# Patient Record
Sex: FEMALE | Race: WHITE | NOT HISPANIC OR LATINO | Employment: PART TIME | URBAN - METROPOLITAN AREA
[De-identification: names, ages, dates, MRNs, and addresses within clinical notes are randomized per-mention and may not be internally consistent; named-entity substitution may affect disease eponyms.]

---

## 2018-03-23 PROBLEM — F41.8 DEPRESSION WITH ANXIETY: Status: ACTIVE | Noted: 2018-03-23

## 2018-03-23 PROBLEM — F60.3 BORDERLINE PERSONALITY DISORDER (HCC): Status: ACTIVE | Noted: 2018-03-23

## 2018-04-13 PROBLEM — F33.9 MAJOR DEPRESSIVE DISORDER, RECURRENT EPISODE WITH ANXIOUS DISTRESS (HCC): Status: ACTIVE | Noted: 2018-04-13

## 2018-05-23 ENCOUNTER — DOCUMENTATION (OUTPATIENT)
Dept: BEHAVIORAL HEALTH | Facility: PHYSICIAN GROUP | Age: 23
End: 2018-05-23

## 2018-05-24 ENCOUNTER — HOSPITAL ENCOUNTER (OUTPATIENT)
Dept: BEHAVIORAL HEALTH | Facility: MEDICAL CENTER | Age: 23
End: 2018-05-24
Attending: PSYCHIATRY & NEUROLOGY
Payer: OTHER GOVERNMENT

## 2018-05-24 DIAGNOSIS — F60.3 BORDERLINE PERSONALITY DISORDER (HCC): ICD-10-CM

## 2018-05-24 DIAGNOSIS — F33.9 MAJOR DEPRESSIVE DISORDER, RECURRENT EPISODE WITH ANXIOUS DISTRESS (HCC): ICD-10-CM

## 2018-05-24 RX ORDER — VENLAFAXINE 75 MG/1
75 TABLET ORAL 3 TIMES DAILY
COMMUNITY
End: 2018-06-08
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
## 2018-05-29 ENCOUNTER — OFFICE VISIT (OUTPATIENT)
Dept: BEHAVIORAL HEALTH | Facility: PHYSICIAN GROUP | Age: 23
End: 2018-05-29
Payer: OTHER GOVERNMENT

## 2018-05-29 DIAGNOSIS — Z62.810 HISTORY OF SEXUAL ABUSE IN CHILDHOOD: ICD-10-CM

## 2018-05-29 DIAGNOSIS — F60.3 BORDERLINE PERSONALITY DISORDER (HCC): ICD-10-CM

## 2018-05-29 PROCEDURE — 99204 OFFICE O/P NEW MOD 45 MIN: CPT | Performed by: STUDENT IN AN ORGANIZED HEALTH CARE EDUCATION/TRAINING PROGRAM
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
## 2018-05-30 ENCOUNTER — HOSPITAL ENCOUNTER (OUTPATIENT)
Dept: BEHAVIORAL HEALTH | Facility: MEDICAL CENTER | Age: 23
End: 2018-05-30
Attending: PSYCHIATRY & NEUROLOGY
Payer: OTHER GOVERNMENT

## 2018-05-30 DIAGNOSIS — F33.9 MAJOR DEPRESSIVE DISORDER, RECURRENT EPISODE WITH ANXIOUS DISTRESS (HCC): ICD-10-CM

## 2018-05-30 PROCEDURE — G0177 OPPS/PHP; TRAIN & EDUC SERV: HCPCS

## 2018-05-30 PROCEDURE — G0176 OPPS/PHP;ACTIVITY THERAPY: HCPCS

## 2018-05-30 PROCEDURE — G0411 INTER ACTIVE GRP PSYCH PARTI: HCPCS

## 2018-05-30 NOTE — BH THERAPY
Group Therapy Checklist  Attendance: Attended  Attendance Duration (min): 60  Number of Participants: 9  Program/Group: Partial Hospital Program  Topics Covered: Cognitive distortions  Participation: Active verbal participation  Affect/Mood Range: Normal range, Flexible  Affect/Mood Display: Congruent w/content  Cognition: Alert, Oriented  Evidence of Imminent Suicide Risk: No  Evidence of imminent homicide risk: No  Therapeutic Interventions: Cognitive clarification  Progress Toward Treatment Goal: Mild improvement

## 2018-05-30 NOTE — ADDENDUM NOTE
Encounter addended by: REINIER Cohen on: 5/30/2018 10:55 AM<BR>    Actions taken: Sign clinical note

## 2018-05-30 NOTE — BH THERAPY
Group Therapy Checklist  Attendance: Attended  Attendance Duration (min): 60  Program/Group: Partial Hospital Program  Topics Covered: Pain vs. Suffering  Participation: Active verbal participation  Affect/Mood Range: Normal range, Flexible  Affect/Mood Display: Congruent w/content  Cognition: Alert, Oriented  Evidence of Imminent Suicide Risk: No  Evidence of imminent homicide risk: No  Therapeutic Interventions: Cognitive clarification, Emotion clarification  Progress Toward Treatment Goal: Moderate improvement

## 2018-05-30 NOTE — BH THERAPY
Group Therapy Checklist  Attendance: Attended  Attendance Duration (min): 60  Number of Participants: 5  Program/Group: Partial Hospital Program  Topics Covered:  (creative art)  Participation: Active verbal participation  Affect/Mood Range: Normal range, Flexible  Affect/Mood Display: Congruent w/content  Cognition: Alert, Oriented  Evidence of Imminent Suicide Risk: No  Evidence of imminent homicide risk: No  Therapeutic Interventions: Socialization, Leisure and Recreation skills  Progress Toward Treatment Goal: Moderate improvement

## 2018-05-31 ENCOUNTER — HOSPITAL ENCOUNTER (OUTPATIENT)
Dept: BEHAVIORAL HEALTH | Facility: MEDICAL CENTER | Age: 23
End: 2018-05-31
Attending: PSYCHIATRY & NEUROLOGY
Payer: OTHER GOVERNMENT

## 2018-05-31 DIAGNOSIS — F33.9 MAJOR DEPRESSIVE DISORDER, RECURRENT EPISODE WITH ANXIOUS DISTRESS (HCC): ICD-10-CM

## 2018-05-31 PROCEDURE — G0176 OPPS/PHP;ACTIVITY THERAPY: HCPCS

## 2018-05-31 PROCEDURE — G0410 GRP PSYCH PARTIAL HOSP 45-50: HCPCS

## 2018-05-31 PROCEDURE — G0177 OPPS/PHP; TRAIN & EDUC SERV: HCPCS

## 2018-05-31 NOTE — BH THERAPY
Group Therapy Checklist  Attendance: Attended  Attendance Duration (min):  (90 min.)  Number of Participants: 11  Program/Group: Partial Hospital Program  Topics Covered: Other (Comment): (Group Therapy)  Participation: No verbal participation, Attentive  Pt was triggered when a peer was talking about needing to give forgiveness to one who has wronged you no matter what happened.   Affect/Mood Range: Constricted  Affect/Mood Display: Congruent w/content, Angry  Cognition: Oriented, Alert  Evidence of Imminent Suicide Risk: No  Evidence of imminent homicide risk: No  Therapeutic Interventions: Emotion clarification, Cognitive clarification  Progress Toward Treatment Goal: Mild improvement

## 2018-05-31 NOTE — BH THERAPY
Group Therapy Checklist  Attendance: Attended  Attendance Duration (min): 60  Number of Participants: 5  Program/Group: [facility] Hospital Program  Topics Covered: Regulating emotions  Participation: Active verbal participation  Affect/Mood Range: Normal range, Flexible  Affect/Mood Display: Congruent w/content  Cognition: Alert, Oriented  Evidence of Imminent Suicide Risk: No  Evidence of imminent homicide risk: No  Therapeutic Interventions: Emotion clarification  Progress Toward Treatment Goal: Moderate improvement

## 2018-05-31 NOTE — BH THERAPY
Group Therapy Checklist  Attendance: Attended  Attendance Duration (min): 60  Number of Participants: 4  Program/Group: Partial Hospital Program  Topics Covered:  (Interactive game)  Participation: Active verbal participation  Affect/Mood Range: Normal range, Flexible  Affect/Mood Display: Congruent w/content  Cognition: Alert, Oriented  Evidence of Imminent Suicide Risk: No  Evidence of imminent homicide risk: No  Therapeutic Interventions: Socialization, Leisure and Recreation skills  Progress Toward Treatment Goal: Moderate improvement

## 2018-05-31 NOTE — BH THERAPY
Group Therapy Checklist  Attendance: Attended  Attendance Duration (min): 60  Number of Participants: 10  Program/Group: Partial Hospital Program  Topics Covered: Spirituality  Participation: Active verbal participation  Affect/Mood Range: Normal range, Flexible  Affect/Mood Display: Congruent w/content  Cognition: Alert, Oriented  Evidence of Imminent Suicide Risk: No  Evidence of imminent homicide risk: No  Therapeutic Interventions: Emotion clarification, Cognitive clarification  Progress Toward Treatment Goal: Moderate improvement

## 2018-06-01 ENCOUNTER — HOSPITAL ENCOUNTER (OUTPATIENT)
Dept: BEHAVIORAL HEALTH | Facility: MEDICAL CENTER | Age: 23
End: 2018-06-01
Attending: PSYCHIATRY & NEUROLOGY
Payer: OTHER GOVERNMENT

## 2018-06-01 DIAGNOSIS — F33.9 MAJOR DEPRESSIVE DISORDER, RECURRENT EPISODE WITH ANXIOUS DISTRESS (HCC): ICD-10-CM

## 2018-06-01 PROCEDURE — G0410 GRP PSYCH PARTIAL HOSP 45-50: HCPCS

## 2018-06-01 PROCEDURE — G0176 OPPS/PHP;ACTIVITY THERAPY: HCPCS

## 2018-06-01 PROCEDURE — G0177 OPPS/PHP; TRAIN & EDUC SERV: HCPCS

## 2018-06-01 NOTE — BH THERAPY
Group Therapy Checklist  Attendance: Attended  Attendance Duration (min): 60  Number of Participants: 4  Program/Group: Partial Hospital Program  Topics Covered:  (creative art)  Participation: Active verbal participation  Affect/Mood Range: Normal range, Flexible  Affect/Mood Display: Congruent w/content  Cognition: Alert, Oriented  Evidence of Imminent Suicide Risk: No  Evidence of imminent homicide risk: No  Therapeutic Interventions: Socialization, Leisure and Recreation skills  Progress Toward Treatment Goal: Moderate improvement
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
## 2018-06-01 NOTE — BH THERAPY
Group Therapy Checklist  Attendance: Attended  Attendance Duration (min): 60  Number of Participants: 4  Program/Group: Partial Hospital Program  Topics Covered:  (Choices and Balance)  Participation: Active verbal participation  Affect/Mood Range: Normal range, Flexible  Affect/Mood Display: Congruent w/content  Cognition: Alert, Oriented  Evidence of Imminent Suicide Risk: No  Evidence of imminent homicide risk: No  Therapeutic Interventions: Values clarification  Progress Toward Treatment Goal: Moderate improvement

## 2018-06-01 NOTE — BH THERAPY
Group Therapy Checklist  Attendance: Attended  Attendance Duration (min):  (90 min.)  Number of Participants: 9  Program/Group: Partial Hospital Program  Topics Covered: Weekend planning  Participation: Active verbal participation (Pt expressed isdeas and support for a peer in group. )  Affect/Mood Range: Normal range  Affect/Mood Display: Congruent w/content  Cognition: Oriented, Alert  Evidence of Imminent Suicide Risk: No  Evidence of imminent homicide risk: No  Therapeutic Interventions: Emotion clarification, Cognitive clarification  Progress Toward Treatment Goal: Moderate improvement

## 2018-06-01 NOTE — BH THERAPY
Group Therapy Checklist  Attendance: Attended  Attendance Duration (min): 60  Number of Participants: 10  Program/Group: Partial Hospital Program  Topics Covered: ACT concept intro  Participation: Active verbal participation  Affect/Mood Range: Normal range, Flexible  Affect/Mood Display: Congruent w/content  Cognition: Alert, Oriented  Evidence of Imminent Suicide Risk: No  Evidence of imminent homicide risk: No  Therapeutic Interventions: Cognitive clarification, Values clarification  Progress Toward Treatment Goal: Moderate improvement

## 2018-06-04 ENCOUNTER — HOSPITAL ENCOUNTER (OUTPATIENT)
Dept: BEHAVIORAL HEALTH | Facility: MEDICAL CENTER | Age: 23
End: 2018-06-04
Attending: PSYCHIATRY & NEUROLOGY
Payer: OTHER GOVERNMENT

## 2018-06-04 DIAGNOSIS — F33.9 MAJOR DEPRESSIVE DISORDER, RECURRENT EPISODE WITH ANXIOUS DISTRESS (HCC): ICD-10-CM

## 2018-06-04 PROCEDURE — G0410 GRP PSYCH PARTIAL HOSP 45-50: HCPCS

## 2018-06-04 PROCEDURE — G0177 OPPS/PHP; TRAIN & EDUC SERV: HCPCS

## 2018-06-04 PROCEDURE — G0176 OPPS/PHP;ACTIVITY THERAPY: HCPCS

## 2018-06-04 NOTE — BH THERAPY
Group Therapy Checklist  Attendance: Attended  Attendance Duration (min): 60  Number of Participants: 4  Program/Group: Partial Hospital Program  Topics Covered:  (creative art)  Participation: Active verbal participation, Attentive  Affect/Mood Range: Normal range, Flexible  Affect/Mood Display: Congruent w/content  Cognition: Alert, Oriented  Evidence of Imminent Suicide Risk: No  Evidence of imminent homicide risk: No  Therapeutic Interventions: Socialization, Leisure and Recreation skills  Progress Toward Treatment Goal: Moderate improvement

## 2018-06-04 NOTE — BH THERAPY
Group Therapy Checklist  Attendance: Attended  Attendance Duration (min): 60  Number of Participants: 9  Program/Group: Partial Hospital Program  Topics Covered: Assertiveness  Participation: Active verbal participation  Affect/Mood Range: Normal range, Flexible  Affect/Mood Display: Congruent w/content  Cognition: Alert, Oriented  Evidence of Imminent Suicide Risk: No  Evidence of imminent homicide risk: No  Therapeutic Interventions: Cognitive clarification, Communication skills  Progress Toward Treatment Goal: Moderate improvement

## 2018-06-04 NOTE — BH THERAPY
Group Therapy Checklist  Attendance: Attended  Attendance Duration (min): 60  Number of Participants: 4  Program/Group: Partial Hospital Program  Topics Covered: [place] kindness  Participation: Active verbal participation  Affect/Mood Range: Normal range, Flexible  Affect/Mood Display: Congruent w/content  Cognition: Alert, Oriented  Evidence of Imminent Suicide Risk: No  Evidence of imminent homicide risk: No  Therapeutic Interventions: Cognitive clarification, Emotion clarification  Progress Toward Treatment Goal: Moderate improvement

## 2018-06-05 ENCOUNTER — HOSPITAL ENCOUNTER (OUTPATIENT)
Dept: BEHAVIORAL HEALTH | Facility: MEDICAL CENTER | Age: 23
End: 2018-06-05
Attending: PSYCHIATRY & NEUROLOGY
Payer: OTHER GOVERNMENT

## 2018-06-05 ENCOUNTER — TELEPHONE (OUTPATIENT)
Dept: BEHAVIORAL HEALTH | Facility: MEDICAL CENTER | Age: 23
End: 2018-06-05

## 2018-06-05 DIAGNOSIS — F33.9 MAJOR DEPRESSIVE DISORDER, RECURRENT EPISODE WITH ANXIOUS DISTRESS (HCC): ICD-10-CM

## 2018-06-05 PROCEDURE — G0177 OPPS/PHP; TRAIN & EDUC SERV: HCPCS | Performed by: MARRIAGE & FAMILY THERAPIST

## 2018-06-05 PROCEDURE — G0410 GRP PSYCH PARTIAL HOSP 45-50: HCPCS

## 2018-06-05 PROCEDURE — G0177 OPPS/PHP; TRAIN & EDUC SERV: HCPCS

## 2018-06-05 PROCEDURE — G0176 OPPS/PHP;ACTIVITY THERAPY: HCPCS

## 2018-06-05 NOTE — BH THERAPY
Group Therapy Checklist  Attendance: Attended  Attendance Duration (min):  (90 min)  Number of Participants: 8  Program/Group: Partial Hospital Program  Topics Covered: Other (Comment): (Group Therapy)  Participation: Limited verbal participation, Attentive, Supportive to other group members (Pt expressed similar feelings identiying with a peer in group to support her. )  Affect/Mood Range: Normal range  Affect/Mood Display: Congruent w/content  Cognition: Oriented, Alert  Evidence of Imminent Suicide Risk: No  Evidence of imminent homicide risk: No  Therapeutic Interventions: Emotion clarification, Cognitive clarification  Progress Toward Treatment Goal: Moderate improvement

## 2018-06-05 NOTE — BH THERAPY
Group Therapy Checklist  Attendance: Attended  Attendance Duration (min): 60  Number of Participants: 4  Program/Group: Partial Hospital Program  Topics Covered: Time Management  Participation: Active verbal participation  Affect/Mood Range: Normal range, Flexible  Affect/Mood Display: Congruent w/content  Cognition: Alert, Oriented  Evidence of Imminent Suicide Risk: No  Evidence of imminent homicide risk: No  Therapeutic Interventions: Cognitive clarification, Goal-setting  Progress: Moderate improvement

## 2018-06-05 NOTE — BH THERAPY
Group Therapy Checklist  Attendance: Attended  Attendance Duration (min): 90  Number of Participants: 11  Program/Group: Partial Hospital Program  Topics Covered:  (process group)  Participation: Active verbal participation, Supportive to other group members, Attentive, Open to feedback  Affect/Mood Range: Normal range, Flexible  Affect/Mood Display: Congruent w/content  Cognition: Alert, Oriented  Evidence of Imminent Suicide Risk: No  Evidence of imminent homicide risk: No  Therapeutic Interventions: Emotion clarification, Supportive psychotherapy  Progress Toward Treatment Goal: Moderate improvement  Patient actively participated in process group.  Addressed active unresolved emotional issues. Taught some emotional skills and techniques to assist with the emotional skill building that relates to their presenting issues and active treatment plan.

## 2018-06-05 NOTE — BH THERAPY
Group Therapy Checklist  Attendance: Attended  Attendance Duration (min):  (60 min.)  Number of Participants: 3  Program/Group: Partial Hospital Program  Topics Covered: Other (Comment): (Activity Group)  Participation: Active verbal participation, Attentive, Supportive to other group members  Affect/Mood Range: Normal range  Affect/Mood Display: Congruent w/content  Cognition: Oriented, Alert  Evidence of Imminent Suicide Risk: No  Evidence of imminent homicide risk: No  Therapeutic Interventions: Leisure and Recreation skills  Progress Toward Treatment Goal: Moderate improvement

## 2018-06-06 ENCOUNTER — HOSPITAL ENCOUNTER (OUTPATIENT)
Dept: BEHAVIORAL HEALTH | Facility: MEDICAL CENTER | Age: 23
End: 2018-06-06
Attending: PSYCHIATRY & NEUROLOGY
Payer: OTHER GOVERNMENT

## 2018-06-06 DIAGNOSIS — F33.9 MAJOR DEPRESSIVE DISORDER, RECURRENT EPISODE WITH ANXIOUS DISTRESS (HCC): ICD-10-CM

## 2018-06-06 PROCEDURE — G0177 OPPS/PHP; TRAIN & EDUC SERV: HCPCS

## 2018-06-06 PROCEDURE — G0411 INTER ACTIVE GRP PSYCH PARTI: HCPCS

## 2018-06-06 PROCEDURE — G0176 OPPS/PHP;ACTIVITY THERAPY: HCPCS

## 2018-06-06 NOTE — CARE PLAN
Problem: Ineffective Coping  Goal: Achieve stable functioning  Apoorva will attend [facility] 9am-3pm M-F and process recent suicide attempt, develop coping skills and increase her understanding of her mental health.  She will verbalize strategies to enhance coping skills during process groups and in individual counseling sessions.      Intervention: Conflict Resolution  Processed relationship with her brother and his girlfriend.

## 2018-06-06 NOTE — BH THERAPY
Group Therapy Checklist  Attendance: Attended  Attendance Duration (min): 60  Number of Participants: 3  Program/Group: Partial Hospital Program  Topics Covered: Caring for Ourselves  Participation: Active verbal participation  Affect/Mood Range: Normal range, Flexible  Affect/Mood Display: Congruent w/content  Cognition: Alert, Oriented  Evidence of Imminent Suicide Risk: No  Evidence of imminent homicide risk: No  Therapeutic Interventions: Cognitive clarification, Self-care skills  Progress Toward Treatment Goal: Moderate improvement

## 2018-06-06 NOTE — BH THERAPY
Group Therapy Checklist  Attendance: Attended  Attendance Duration (min): 60  Number of Participants: 3  Program/Group: Partial Hospital Program  Topics Covered:  (creative art)  Participation: Active verbal participation  Affect/Mood Range: Normal range, Flexible  Affect/Mood Display: Congruent w/content  Cognition: Alert, Oriented  Evidence of Imminent Suicide Risk: No  Evidence of imminent homicide risk: No  Therapeutic Interventions: Socialization, Leisure and Recreation skills  Progress Toward Treatment Goal: Moderate improvement

## 2018-06-06 NOTE — BH THERAPY
[facility]  Therapy Progress Note    Patient Name: Apoorva Parry  Patient MRN: [id number]  Today's Date: 6/6/2018     Type of session:Individual psychotherapy  Length of session: 30 minutes  Persons in attendance:Patient    Subjective/New Info: Processed her relationship with her brother, feeling sad that he cannot accept her mental health recovery. Apoorva feels supported by him and hopes to return to an even relationship with him. She has support from her mother who resides in [place]. Denies SI or self harm x 3 weeks.     Objective/Observations:   Participation: Active verbal participation, Attentive, Engaged and Open to feedback   Grooming: Casual and Neat   Cognition: Alert and Fully Oriented   Eye contact: Good   Mood: Anxious   Affect: Flexible, Full range, Congruent with content, Sad, Anxious and Tearful   Thought process: Logical and Goal-directed   Speech: Rate within normal limits and Volume within normal limits   Other:     Diagnoses: No diagnosis found. BPD    Current risk:   SUICIDE: Low   Homicide: Not applicable   Self-harm: Not applicable   Relapse: Not applicable   Other:    Safety Plan reviewed? Not Indicated   If evidence of imminent risk is present, intervention/plan:     Therapeutic Intervention(s): Distress tolerance skills, Interpersonal effectiveness skills, Parenting/familial roles addressed, Review treatment plan, Self-care skills and Supportive psychotherapy    Treatment Goal(s)/Objective(s) addressed: distress tolerance skills     Progress toward Treatment Goals: Moderate improvement    Plan:  - Continue Partial Hospital Program  - Next appointment scheduled:  6/7/2018  - Patient is in agreement with the above plan:  YES    Hamida Parker R.N.  6/6/2018

## 2018-06-07 ENCOUNTER — TELEPHONE (OUTPATIENT)
Dept: BEHAVIORAL HEALTH | Facility: MEDICAL CENTER | Age: 23
End: 2018-06-07

## 2018-06-07 ENCOUNTER — HOSPITAL ENCOUNTER (OUTPATIENT)
Dept: BEHAVIORAL HEALTH | Facility: MEDICAL CENTER | Age: 23
End: 2018-06-07
Attending: PSYCHIATRY & NEUROLOGY
Payer: OTHER GOVERNMENT

## 2018-06-07 DIAGNOSIS — F33.9 MAJOR DEPRESSIVE DISORDER, RECURRENT EPISODE WITH ANXIOUS DISTRESS (HCC): ICD-10-CM

## 2018-06-07 PROCEDURE — G0177 OPPS/PHP; TRAIN & EDUC SERV: HCPCS

## 2018-06-07 PROCEDURE — G0410 GRP PSYCH PARTIAL HOSP 45-50: HCPCS

## 2018-06-07 PROCEDURE — G0176 OPPS/PHP;ACTIVITY THERAPY: HCPCS

## 2018-06-07 NOTE — BH THERAPY
Group Therapy Checklist  Attendance: Attended  Attendance Duration (min): 60  Number of Participants: 3  Program/Group: Partial Hospital Program  Topics Covered: Mindfulness, Mindful practice  Participation: Active verbal participation  Affect/Mood Range: Normal range, Flexible  Affect/Mood Display: Congruent w/content  Cognition: Alert, Oriented  Evidence of Imminent Suicide Risk: No  Evidence of imminent homicide risk: No  Therapeutic Interventions: Cognitive clarification, Emotion clarification, Mindfulness exercise  Progress Toward Treatment Goal: Moderate improvement

## 2018-06-07 NOTE — BH THERAPY
Group Therapy Checklist  Attendance: Attended, arrived late  Attendance Duration (min): 0  Number of Participants: 8  Program/Group: Partial Hospital Program  Topics Covered: Mindfulness  Evidence of Imminent Suicide Risk: No  Evidence of imminent homicide risk: No  Therapeutic Interventions: Emotion clarification, Distress tolerance skills  Progress Toward Treatment Goal: No change

## 2018-06-07 NOTE — BH THERAPY
Group Therapy Checklist  Attendance: Attended  Attendance Duration (min):  (90 min.)  Number of Participants: 9  Program/Group: Partial Hospital Program  Topics Covered: Other (Comment): (Group Therapy)  Participation: Active verbal participation, Attentive (Pt expressed wanting to be done after 2 weeks in program and go back to work. She listened to feedback to be here the second time to learn and hear what she didn't the first time. )  Affect/Mood Range: Normal range  Affect/Mood Display: Congruent w/content  Cognition: Oriented, Alert  Evidence of Imminent Suicide Risk: No  Evidence of imminent homicide risk: No  Therapeutic Interventions: Emotion clarification, Cognitive clarification  Progress Toward Treatment Goal: Mild improvement

## 2018-06-08 ENCOUNTER — HOSPITAL ENCOUNTER (OUTPATIENT)
Dept: BEHAVIORAL HEALTH | Facility: MEDICAL CENTER | Age: 23
End: 2018-06-08
Attending: PSYCHIATRY & NEUROLOGY
Payer: OTHER GOVERNMENT

## 2018-06-08 ENCOUNTER — TELEPHONE (OUTPATIENT)
Dept: BEHAVIORAL HEALTH | Facility: MEDICAL CENTER | Age: 23
End: 2018-06-08

## 2018-06-08 VITALS
DIASTOLIC BLOOD PRESSURE: 98 MMHG | HEIGHT: 67 IN | HEART RATE: 100 BPM | SYSTOLIC BLOOD PRESSURE: 133 MMHG | WEIGHT: 245 LBS | BODY MASS INDEX: 38.45 KG/M2

## 2018-06-08 DIAGNOSIS — F60.3 BORDERLINE PERSONALITY DISORDER (HCC): ICD-10-CM

## 2018-06-08 DIAGNOSIS — F41.9 ANXIETY DISORDER, UNSPECIFIED TYPE: ICD-10-CM

## 2018-06-08 DIAGNOSIS — F32.A DEPRESSIVE DISORDER: ICD-10-CM

## 2018-06-08 PROCEDURE — G0177 OPPS/PHP; TRAIN & EDUC SERV: HCPCS

## 2018-06-08 PROCEDURE — G0176 OPPS/PHP;ACTIVITY THERAPY: HCPCS

## 2018-06-08 PROCEDURE — 99214 OFFICE O/P EST MOD 30 MIN: CPT | Performed by: PSYCHIATRY & NEUROLOGY

## 2018-06-08 PROCEDURE — 99214 OFFICE O/P EST MOD 30 MIN: CPT

## 2018-06-08 PROCEDURE — G0410 GRP PSYCH PARTIAL HOSP 45-50: HCPCS

## 2018-06-08 RX ORDER — CLONAZEPAM 0.5 MG/1
0.5 TABLET ORAL 2 TIMES DAILY PRN
COMMUNITY
End: 2018-08-03

## 2018-06-08 RX ORDER — VENLAFAXINE HYDROCHLORIDE 75 MG/1
75 CAPSULE, EXTENDED RELEASE ORAL DAILY
COMMUNITY
End: 2018-06-29 | Stop reason: SDUPTHER

## 2018-06-08 NOTE — TELEPHONE ENCOUNTER
[facility]    Treatment Team Staffing    Patient Name: Apoorva Parry Program: [facility] Date: 6/8/2018     Attendees:  ANTHONY Ngo, [facility]; Hamida Parker RN and Jacque Daigle MD    Patient's Progress toward Goals Listed on the Treatment Plan: improved self awareness and emotional regulation    1. Client's Participation When in Attendance Was: Active in a Positive Way    2. Counselor's Evaluation of Client's Progress: Positive Movement    3. Patient is attending group and individual sessions and is progressing well toward the treatment goals: yes      YES NO   A. Relapse During Program []  [x]    B. Requires physician review []  [x]    C. Referral to program inappropriate []  [x]    D. Non compliance with Treatment Plan []  [x]    E. Early treatment termination (lack of attendance) []  [x]     []  []      Comments: To IOP next week    Treatment Plan Review: - Continue [facility] Program  - Next appointment scheduled:  6/11/2018  - Patient is in agreement with the above plan:  YES

## 2018-06-08 NOTE — CARE PLAN
"Problem: Ineffective Coping  Goal: Achieve stable functioning  Apoorva will attend [facility] 9am-3pm M-F and process recent suicide attempt, develop coping skills and increase her understanding of her mental health.  She will verbalize strategies to enhance coping skills during process groups and in individual counseling sessions.      Intervention: Individual Counseling Sessions   [facility]  Therapy Progress Note    Patient Name: Apoorva Parry  Patient MRN: [id number]  Today's Date: 6/8/2018     Type of session:Individual psychotherapy  Length of session: 30 minutes  Persons in attendance:Patient    Subjective/New Info: processed breakup with BF of three years, Evan. She states she is surprised that she handled things in a [id number] manner and that she did not \"spiral down\". Allowed appropriate sad feelings and moved forward. Today she processed return to work paperwork for mid-June. Apoorva talks with supportive mom daily, feels she and her brother are harmonious again. She identified catastrophism, over generalizing and fortune telling as cognitive distortions. Denies SI or self harm.     Objective/Observations:   Participation: Active verbal participation, Attentive, Engaged and Open to feedback   Grooming: Casual and Neat   Cognition: Alert and Fully Oriented   Eye contact: Good   Mood: Euthymic   Affect: Flexible, Full range and Congruent with content   Thought process: Logical and Goal-directed   Speech: Rate within normal limits and Volume within normal limits   Other:     Diagnoses: No diagnosis found. BPD    Current risk:   SUICIDE: Not applicable   Homicide: Not applicable   Self-harm: Not applicable   Relapse: Not applicable   Other:    Safety Plan reviewed? Not Indicated   If evidence of imminent risk is present, intervention/plan:     Therapeutic Intervention(s): Clarify:  Clarify feelings, Distress tolerance skills, Interpersonal effectiveness skills, Leisure and recreation skills, " Review treatment plan, Self-care skills, Stressors assessed and Supportive psychotherapy    Treatment Goal(s)/Objective(s) addressed: recognize and replace self defeating thoughts, boundaries, and mindfulness.      Progress toward Treatment Goals: Moderate improvement    Plan:  - Continue [facility] Hospital Program  - Next appointment scheduled:  6/11/2018  - Patient is in agreement with the above plan:  YES    Hamida Parker R.N.  6/8/2018

## 2018-06-08 NOTE — BH THERAPY
Group Therapy Checklist  Attendance: Attended  Attendance Duration (min):  (60 minutes)  Number of Participants: 3  Program/Group: [facility] Hospital Program  Topics Covered:  (creative arts)  Participation: Active verbal participation, Attentive  Affect/Mood Range: Normal range, Flexible  Affect/Mood Display: Congruent w/content  Cognition: Alert, Oriented  Evidence of imminent homicide risk: No  Therapeutic Interventions: Leisure and Recreation skills, Goal-setting  Progress Toward Treatment Goal: Moderate improvement.

## 2018-06-08 NOTE — BH THERAPY
Group Therapy Checklist  Attendance: Attended  Attendance Duration (min): 60  Number of Participants: 3  Program/Group: Partial Hospital Program  Topics Covered:  (Habit)  Participation: Active verbal participation  Affect/Mood Range: Normal range, Flexible  Affect/Mood Display: Congruent w/content  Cognition: Alert, Oriented  Evidence of Imminent Suicide Risk: No  Evidence of imminent homicide risk: No  Therapeutic Interventions: Cognitive clarification, Behavioral activation  Progress Toward Treatment Goal: Moderate improvement

## 2018-06-08 NOTE — BH THERAPY
Group Therapy Checklist  Attendance: Attended  Attendance Duration (min):  (90 min.)  Number of Participants: 9  Program/Group: Partial Hospital Program  Topics Covered: Weekend planning  Participation: Active verbal participation (Pt expressed breaking up with her boyfriend lst night to focus her time and space on herself and mental health recovery.  )  Affect/Mood Range: Normal range  Affect/Mood Display: Congruent w/content  Cognition: Oriented, Alert  Evidence of Imminent Suicide Risk: No  Evidence of imminent homicide risk: No  Therapeutic Interventions: Emotion clarification, Cognitive clarification  Progress Toward Treatment Goal: Moderate improvement

## 2018-06-08 NOTE — PROGRESS NOTES
PARTIAL HOSPITALIZATION PROGRAM FOLLOW-UP NOTE      Chief Complaint   Patient presents with   • Follow-Up     depression, anxiety         History Of Present Illness:  Apoorva Parry is a 23 y.o. old female with depressive disorder, anxiety disorder, borderline personality disorder seen today as PHP follow up, was last seen 1 week ago by Dr. Ramirez.  She has been feeling better in regards to her mood and anxiety and is endorsing benefit from the intensive psychotherapy.  She has not had any self-harm behavior since she started the program and is no longer having active or passive thoughts of wanting to hurt herself or anybody else.  She is feeling more hopeful and is more forward looking.  She has been keeping herself busy and has been staying in touch with her friends.  She has good support from her mother who lives in [place] and brother who lives in [place].  She has a lot of friends in town and keeps in touch with them.  She states that even her friends have noticed an improvement in her symptoms.  She has been compliant with Effexor and Klonopin and endorses benefit from both the medications.  Sleep and appetite have been good.  Denies any new psychosocial stressors.  She feels that she can go back to work and has good support from her coworkers as well.  She does have a psychiatrist where she lives and plans on following up with him when she is done with the program.  She was getting psychotherapy at [facility] but her therapist retired and she is having a hard time finding a therapist.    Social History:   She is currently single and lives alone in [place].  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    Substance Use:  Alcohol - Social drinking, denies recent binge drinking  Nicotine - Smokes few cigarettes daily  Illicit drugs - Denies    Medications:  Current Outpatient Prescriptions   Medication Sig Dispense Refill   • venlafaxine XR (EFFEXOR XR) 75  "MG CAPSULE SR 24 HR Take 75 mg by mouth every day.     • clonazePAM (KLONOPIN) 0.5 MG Tab Take 0.25 mg by mouth 2 times a day as needed (anxiety).       No current facility-administered medications for this encounter.        Review Of Systems:    Constitutional - Negative for fatigue  Respiratory - Negative for shortness of breath, cough  CVS - Negative for chest pain, palpitations  GI - Negative for nausea, vomiting, abdominal pain, diarrhea, constipation  Musculoskeletal - Negative for back pain  Neurological - Negative for headaches  Psychiatric - Positive for anxiety    Physical Examination:  Vital signs: /98   Pulse 100   Ht 1.702 m (5' 7\")   Wt 111.1 kg (245 lb)   BMI 38.37 kg/m²     Musculoskeletal: Normal gait. No abnormal movements.     Mental Status Evaluation:   General: Young white female, dressed in casual attire, good grooming and hygiene, in no apparent distress, calm and cooperative, good eye contact, no psychomotor agitation or retardation  Orientation: Alert and oriented to person, place and time  Recent and remote memory: Grossly intact  Attention span and concentration: Grossly intact  Speech: Spontaneous, normal rate, rhythm and tone  Thought Process: Linear, logical and goal directed  Thought Content: Denies suicidal or homicidal ideations, intent or plan  Perception: Denies auditory or visual hallucinations. No delusions noted  Associations: Intact  Language: Appropriate  Fund of knowledge and vocabulary: Grossly adequate  Mood: \"am good\"  Affect: Euthymic, mood congruent  Insight: Good  Judgment: Good      Impression:  1. Unspecified depressive disorder  2. Unspecified anxiety disorder  3. Borderline personality disorder    Plan:  1. Continue Effexor XR 75 mg in the morning for anxiety and depression  2. Continue Klonopin 0.5 mg twice daily as needed for anxiety. Not refilled today.  3. Transition to IOP on 6/11/18    Follow up in 4 weeks with her outside psychiatrist or sooner if " symptoms worsen    The proposed treatment plan was discussed with the patient who was provided the opportunity to ask questions and make suggestions regarding alternative treatment. Patient verbalized understanding and expressed agreement with the plan.     Jacque Daigle M.D.  06/08/18    This note was created using voice recognition software (Dragon). The accuracy of the dictation is limited by the abilities of the software. I have reviewed the note prior to signing, however some errors in grammar and context are still possible. If you have any questions related to this note please do not hesitate to contact our office.

## 2018-06-08 NOTE — LETTER
2018      Reg: Apoorva Parry      To whom it may concern,      This is to let you know that Apoorva Parry ( 1995) was seen today and she is stable to go back to work on 18 without any restrictions.       Please feel free to contact me if there are further questions.      Sincerely,        Jacque Daigle MD

## 2018-06-11 ENCOUNTER — TELEPHONE (OUTPATIENT)
Dept: BEHAVIORAL HEALTH | Facility: MEDICAL CENTER | Age: 23
End: 2018-06-11

## 2018-06-12 ENCOUNTER — HOSPITAL ENCOUNTER (OUTPATIENT)
Dept: BEHAVIORAL HEALTH | Facility: MEDICAL CENTER | Age: 23
End: 2018-06-12
Attending: PSYCHIATRY & NEUROLOGY
Payer: OTHER GOVERNMENT

## 2018-06-12 ENCOUNTER — TELEPHONE (OUTPATIENT)
Dept: BEHAVIORAL HEALTH | Facility: MEDICAL CENTER | Age: 23
End: 2018-06-12

## 2018-06-12 DIAGNOSIS — F33.9 MAJOR DEPRESSIVE DISORDER, RECURRENT EPISODE WITH ANXIOUS DISTRESS (HCC): ICD-10-CM

## 2018-06-12 PROCEDURE — 90853 GROUP PSYCHOTHERAPY: CPT

## 2018-06-12 PROCEDURE — 90834 PSYTX W PT 45 MINUTES: CPT | Mod: XU

## 2018-06-12 NOTE — BH THERAPY
Group Therapy Checklist  Attendance: Attended  Attendance Duration (min): 90  Number of Participants: 8  Program/Group: Intensive Outpatient Program  Topics Covered: Other (Comment): (Process Therapy)  Participation: Active verbal participation, Attentive, Supportive to other group members  Affect/Mood Range: Normal range  Affect/Mood Display: Congruent w/content  Cognition: Alert, Oriented  Evidence of Imminent Suicide Risk: No  Evidence of imminent homicide risk: No  Therapeutic Interventions: Problem-solving, Behavioral activation, Communication skills  Progress Toward Treatment Goal: Significant improvement    Apoorva was noted to be ingaged in the group and provide moderate feed-back to other participants to include encouragement and behavioral activation. She shared about the improvements of her own mental health and successes in interpersonal relationships this past weekend. She also discussed healthy boundary setting in a new relationship.

## 2018-06-12 NOTE — CARE PLAN
Problem: Ineffective Coping  Goal: Stable mood and functioning  IOP three times weekly, three hours a day with weekly individual session to explore mood management, sleep, self esteem, depression, emotional regulation and communication skills. Develop personalized recovery plan by discharge.      Intervention: Individual Counseling Sessions   [facility]  Therapy Progress Note    Patient Name: Apoorva Parry  Patient MRN: [id number]  Today's Date: 6/12/2018     Type of session:Individual psychotherapy  Length of session: 45 minutes  Persons in attendance:Patient    Subjective/New Info: Apoorva processed her weekend, more social - attending event with friends. She reflected on feeling calm and peaceful vs feeling numb; the feelings today were about being in a good place. Processed maintaining balance without highs and lows. Apoorva plans on returning to work 6/29. Updated treatment plan to include balance through mindfulness, journal writing and yoga.     Objective/Observations:   Participation: Active verbal participation, Attentive, Engaged and Open to feedback   Grooming: Casual and Neat   Cognition: Alert and Fully Oriented   Eye contact: Good   Mood: Euthymic   Affect: Flexible and Full range   Thought process: Logical and Goal-directed   Speech: Rate within normal limits and Volume within normal limits   Other:     Diagnoses:   1. Major depressive disorder, recurrent episode with anxious distress (HCC)         Current risk:   SUICIDE: Not applicable   Homicide: Not applicable   Self-harm: Not applicable   Relapse: Not applicable   Other:    Safety Plan reviewed? Not Indicated   If evidence of imminent risk is present, intervention/plan:     Therapeutic Intervention(s): Distress tolerance skills, Interpersonal effectiveness skills, Leisure and recreation skills, Positive behavior reinforced, Review treatment plan, Self-care skills, Socialization and Supportive psychotherapy    Treatment  Goal(s)/Objective(s) addressed: journal writing, mindfulness, self care, leisure activity.      Progress toward Treatment Goals: Moderate improvement    Plan:  - Continue Intensive Outpatient Program  - Next appointment scheduled:  6/14/2018  - Patient is in agreement with the above plan:  YES    Hamida Parker R.N.  6/12/2018

## 2018-06-12 NOTE — TELEPHONE ENCOUNTER
[facility]    Treatment Team Staffing    Patient Name: Apoorva Parry Program: IOP Date: 6/12/2018     Attendees:  Dolly Salmon RN, [facility]; ANTHONY Ngo, [facility]; Hamida Parker RN and Jacque Daigle MD    Patient's Progress toward Goals Listed on the Treatment Plan: good emotional regulation, even during social stressors    1. Client's Participation When in Attendance Was: Active in a Positive Way    2. Counselor's Evaluation of Client's Progress: Positive Movement    3. Patient is attending group and individual sessions and is progressing well toward the treatment goals: yes      YES NO   A. Relapse During Program []  [x]    B. Requires physician review []  [x]    C. Referral to program inappropriate []  [x]    D. Non compliance with Treatment Plan []  [x]    E. Early treatment termination (lack of attendance) []  [x]     []  []      Comments: return to work 6/29/18    Treatment Plan Review: - Continue Intensive Outpatient Program  - Patient is in agreement with the above plan:  YES

## 2018-06-14 ENCOUNTER — HOSPITAL ENCOUNTER (OUTPATIENT)
Dept: BEHAVIORAL HEALTH | Facility: MEDICAL CENTER | Age: 23
End: 2018-06-14
Attending: PSYCHIATRY & NEUROLOGY
Payer: OTHER GOVERNMENT

## 2018-06-14 DIAGNOSIS — F41.8 DEPRESSION WITH ANXIETY: ICD-10-CM

## 2018-06-14 PROCEDURE — 90853 GROUP PSYCHOTHERAPY: CPT | Performed by: MARRIAGE & FAMILY THERAPIST

## 2018-06-14 NOTE — BH THERAPY
Group Therapy Checklist  Attendance: Attended  Attendance Duration (min):  (90 min.)  Number of Participants: 8  Program/Group: Intensive Outpatient Program  Topics Covered: Boundaries  Participation: Active verbal participation, Attentive, Supportive to other group members (Pt expressed holding her boundaries to keep her space to work on herself emotionally. )  Affect/Mood Range: Normal range  Affect/Mood Display: Congruent w/content  Cognition: Oriented, Alert  Evidence of Imminent Suicide Risk: No  Evidence of imminent homicide risk: No  Therapeutic Interventions: Emotion clarification, Cognitive clarification  Progress Toward Treatment Goal: Moderate improvement

## 2018-06-14 NOTE — BH THERAPY
Group Therapy Checklist  Attendance: Attended  Attendance Duration (min): 46-60  Number of Participants: 9  Program/Group: Intensive Outpatient Program  Topics Covered: Relationships in Recovery  Participation: Active verbal participation, Attentive  Affect/Mood Range: Normal range, Flexible  Affect/Mood Display: Congruent w/content  Cognition: Alert, Oriented  Evidence of Imminent Suicide Risk: No  Evidence of imminent homicide risk: No  Therapeutic Interventions: Emotion clarification  Progress Toward Treatment Goal: Moderate improvement

## 2018-06-15 ENCOUNTER — TELEPHONE (OUTPATIENT)
Dept: BEHAVIORAL HEALTH | Facility: MEDICAL CENTER | Age: 23
End: 2018-06-15

## 2018-06-19 ENCOUNTER — HOSPITAL ENCOUNTER (OUTPATIENT)
Dept: BEHAVIORAL HEALTH | Facility: MEDICAL CENTER | Age: 23
End: 2018-06-19
Attending: PSYCHIATRY & NEUROLOGY
Payer: OTHER GOVERNMENT

## 2018-06-19 DIAGNOSIS — F60.3 BORDERLINE PERSONALITY DISORDER (HCC): ICD-10-CM

## 2018-06-19 DIAGNOSIS — F33.9 MAJOR DEPRESSIVE DISORDER, RECURRENT EPISODE WITH ANXIOUS DISTRESS (HCC): ICD-10-CM

## 2018-06-19 PROCEDURE — 90853 GROUP PSYCHOTHERAPY: CPT

## 2018-06-19 NOTE — BH THERAPY
Group Therapy Checklist  Attendance: Attended  Attendance Duration (min):  (60  min.)  Number of Participants: 6  Program/Group: Intensive Outpatient Program  Topics Covered: Forgiveness  Participation: Active verbal participation  Affect/Mood Range: Normal range  Affect/Mood Display: Congruent w/content  Cognition: Oriented, Alert  Evidence of Imminent Suicide Risk: No  Evidence of imminent homicide risk: No  Therapeutic Interventions: Psychoeducation re: (Comment), Emotion clarification  Progress Toward Treatment Goal: Moderate improvement

## 2018-06-19 NOTE — BH THERAPY
Group Therapy Checklist  Attendance: Attended  Attendance Duration (min): 90  Number of Participants: 6  Program/Group: Intensive Outpatient Program  Topics Covered:  (process group)  Participation: Active verbal participation, Attentive, Supportive to other group members, Open to feedback  Affect/Mood Range: Normal range, Flexible  Affect/Mood Display: Congruent w/content  Cognition: Alert, Oriented  Evidence of Imminent Suicide Risk: No  Evidence of imminent homicide risk: No  Therapeutic Interventions: Emotion clarification, Supportive psychotherapy  Progress Toward Treatment Goal: Moderate improvement  Apoorva processed a busy week, returning to work. She feels supported at work and felt welcomed back. Apoorva will continue to work her 28 hours/weekly. She processed her boundaries with family, especially her mom, and feels validated by the relationships. Receptive to peer support.

## 2018-06-21 ENCOUNTER — HOSPITAL ENCOUNTER (OUTPATIENT)
Dept: BEHAVIORAL HEALTH | Facility: MEDICAL CENTER | Age: 23
End: 2018-06-21
Attending: PSYCHIATRY & NEUROLOGY
Payer: OTHER GOVERNMENT

## 2018-06-21 DIAGNOSIS — F60.3 BORDERLINE PERSONALITY DISORDER (HCC): ICD-10-CM

## 2018-06-21 DIAGNOSIS — F33.9 MAJOR DEPRESSIVE DISORDER, RECURRENT EPISODE WITH ANXIOUS DISTRESS (HCC): ICD-10-CM

## 2018-06-21 PROCEDURE — 90853 GROUP PSYCHOTHERAPY: CPT

## 2018-06-21 NOTE — ADDENDUM NOTE
Encounter addended by: Hamida Parker R.N. on: 6/21/2018  3:37 PM<BR>    Actions taken: Sign clinical note

## 2018-06-21 NOTE — BH THERAPY
Group Therapy Checklist  Attendance: Attended  Attendance Duration (min):  (90 min)  Number of Participants: 11  Program/Group: Intensive Outpatient Program  Topics Covered: Other (Comment): (Group Therapy)  Participation: Active verbal participation (Pt expressed recent episodes of vertigo.  She will see her PCP next week.   ome anxiety about this was noticeable. Pt was supportive to peers in group.)  Affect/Mood Range: Normal range  Affect/Mood Display: Congruent w/content  Cognition: Oriented, Alert  Evidence of Imminent Suicide Risk: No  Evidence of imminent homicide risk: No  Therapeutic Interventions: Emotion clarification, Cognitive clarification  Progress Toward Treatment Goal: Moderate improvement

## 2018-06-22 ENCOUNTER — HOSPITAL ENCOUNTER (OUTPATIENT)
Dept: BEHAVIORAL HEALTH | Facility: MEDICAL CENTER | Age: 23
End: 2018-06-22
Attending: PSYCHIATRY & NEUROLOGY
Payer: OTHER GOVERNMENT

## 2018-06-22 DIAGNOSIS — F41.8 DEPRESSION WITH ANXIETY: ICD-10-CM

## 2018-06-22 DIAGNOSIS — F33.9 MAJOR DEPRESSIVE DISORDER, RECURRENT EPISODE WITH ANXIOUS DISTRESS (HCC): ICD-10-CM

## 2018-06-22 DIAGNOSIS — Z62.810 HISTORY OF SEXUAL ABUSE IN CHILDHOOD: ICD-10-CM

## 2018-06-22 DIAGNOSIS — F60.3 BORDERLINE PERSONALITY DISORDER (HCC): ICD-10-CM

## 2018-06-22 PROCEDURE — 90847 FAMILY PSYTX W/PT 50 MIN: CPT

## 2018-06-22 PROCEDURE — 90853 GROUP PSYCHOTHERAPY: CPT

## 2018-06-22 NOTE — PROGRESS NOTES
Group Therapy Checklist  Attendance: Attended  Attendance Duration (min):  (60 min)  Number of Participants: 11  Program/Group: Intensive Outpatient Program  Topics Covered: Med aspects [place]  Participation: Active verbal participation  Affect/Mood Range: Normal range, Flexible  Affect/Mood Display: Congruent w/content  Cognition: Alert, Oriented  Evidence of Imminent Suicide Risk: No  Evidence of imminent homicide risk: No  Therapeutic Interventions: Psychoeducation re: (Comment)  Progress Toward Treatment Goal: Mild improvement

## 2018-06-22 NOTE — BH THERAPY
Group Therapy Checklist  Attendance: Attended  Attendance Duration (min):  (90 min.)  Number of Participants: 12  Program/Group: Intensive Outpatient Program  Topics Covered: Weekend planning  Participation: Active verbal participation, Attentive (Pt expressed love and conflict withh her mother coming into town in 3 seeks.  Was able to state her deired boundaries with her mom. )  Affect/Mood Range: Labile  Affect/Mood Display: Congruent w/content  Cognition: Oriented, Alert  Evidence of Imminent Suicide Risk: No  Evidence of imminent homicide risk: No  Therapeutic Interventions: Emotion clarification, Cognitive clarification  Progress Toward Treatment Goal: Moderate improvement

## 2018-06-25 NOTE — ADDENDUM NOTE
Encounter addended by: Diamond Ferguson R.N. on: 6/25/2018  8:43 AM<BR>    Actions taken: Charge Capture section accepted

## 2018-06-25 NOTE — ADDENDUM NOTE
Encounter addended by: Diamond Ferguson R.N. on: 6/25/2018  8:42 AM<BR>    Actions taken: Care Plan problems brought forward, Administrative Information edited, Sign clinical note, Care Plan progress modified
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
## 2018-06-26 ENCOUNTER — HOSPITAL ENCOUNTER (OUTPATIENT)
Dept: BEHAVIORAL HEALTH | Facility: MEDICAL CENTER | Age: 23
End: 2018-06-26
Attending: PSYCHIATRY & NEUROLOGY
Payer: OTHER GOVERNMENT

## 2018-06-26 DIAGNOSIS — F60.3 BORDERLINE PERSONALITY DISORDER (HCC): ICD-10-CM

## 2018-06-26 DIAGNOSIS — F41.8 DEPRESSION WITH ANXIETY: ICD-10-CM

## 2018-06-26 PROCEDURE — 90853 GROUP PSYCHOTHERAPY: CPT | Performed by: MARRIAGE & FAMILY THERAPIST

## 2018-06-26 NOTE — BH THERAPY
Group Therapy Checklist  Attendance: Attended  Attendance Duration (min):  (60 min.)  Number of Participants: 7  Program/Group: Intensive Outpatient Program  Topics Covered: Journal writing  Participation: Active verbal participation, Attentive  Affect/Mood Range: Normal range  Affect/Mood Display: Congruent w/content  Cognition: Oriented, Alert  Evidence of Imminent Suicide Risk: No  Evidence of imminent homicide risk: No  Therapeutic Interventions: Psychoeducation re: (Comment), Emotion clarification  Progress Toward Treatment Goal: Moderate improvement

## 2018-06-26 NOTE — BH THERAPY
Group Therapy Checklist  Attendance: Attended  Attendance Duration (min): 90  Number of Participants: 7  Program/Group: Intensive Outpatient Program  Topics Covered: Other (Comment): Process Group  Participation: Active verbal participation, Attentive, Supportive to other group members  Affect/Mood Range: Normal range  Affect/Mood Display: Congruent w/content  Cognition: Alert, Oriented  Evidence of Imminent Suicide Risk: No  Evidence of imminent homicide risk: No  Therapeutic Interventions: Conflict resolution, Limit-setting  Progress Toward Treatment Goal: Moderate improvement    Apoorva participated in group today but was less involved in feedback and peer support. She shared that she is struggling with a decision of whether or not to attend the graduation of her x-significant other. Other participants provided feedback which she was open to.

## 2018-06-28 ENCOUNTER — TELEPHONE (OUTPATIENT)
Dept: BEHAVIORAL HEALTH | Facility: MEDICAL CENTER | Age: 23
End: 2018-06-28

## 2018-06-28 ENCOUNTER — HOSPITAL ENCOUNTER (OUTPATIENT)
Dept: BEHAVIORAL HEALTH | Facility: MEDICAL CENTER | Age: 23
End: 2018-06-28
Attending: PSYCHIATRY & NEUROLOGY
Payer: OTHER GOVERNMENT

## 2018-06-28 DIAGNOSIS — F60.3 BORDERLINE PERSONALITY DISORDER (HCC): ICD-10-CM

## 2018-06-28 DIAGNOSIS — F41.8 DEPRESSION WITH ANXIETY: ICD-10-CM

## 2018-06-28 PROCEDURE — 90853 GROUP PSYCHOTHERAPY: CPT | Performed by: MARRIAGE & FAMILY THERAPIST

## 2018-06-28 NOTE — TELEPHONE ENCOUNTER
[facility]  TRANSFER/DISCHARGE SUMMARY FORM    HHPI / SCP:  Other Ins.:      Patient Name: Apoorva Parry  Admission Date: 18  Level of Care Attended:  Intens.OP : 1995  Transfer/Discharge Date: MRN: [id number]  18       SIGNIFICANT FINDINGS/CLINICAL IMPRESSION:   DSM Codes:   f33.1, F60.3    ICD10 Codes: 296.00  No diagnosis found.    Additional problems identified via assessment: Apoorva has a focus on a previous relationship.    Treatment Components in Which Patient Participated (check all that apply):  Education group(s), 1:1 teaching/therapy, Medication Management and Group Therapy    Summary of Course of Treatment: Apoorva participated well in groups and individual therapy to enhance her emotional regulation skills.      Condition at Time of Transfer/Discharge: Met treatment goals.    [] Medications Reviewed with Copy to Patient    Referred to: Refer to Dr Jenkins, and a DBT group with Dr Dong     Patient is in agreement with discharge plan: yes    Diamond Ferguson R.N.

## 2018-06-28 NOTE — BH THERAPY
Group Therapy Checklist  Attendance: Attended  Attendance Duration (min):  (90 min.)  Number of Participants: 7  Program/Group: Intensive Outpatient Program  Topics Covered: Other (Comment): (Group Therapy)  Participation: Active verbal participation, Attentive, Supportive to other group members, Open to feedback (Pt expressed much imporved emotional balance and gratitude since completing her  IOP program. )  Affect/Mood Range: Normal range  Affect/Mood Display: Congruent w/content  Cognition: Oriented, Alert  Evidence of Imminent Suicide Risk: No  Evidence of imminent homicide risk: No  Therapeutic Interventions: Emotion clarification, Cognitive clarification

## 2018-06-28 NOTE — BH THERAPY
Group Therapy Checklist  Attendance: Attended  Attendance Duration (min):  (60 min)  Number of Participants: 7  Program/Group: Intensive Outpatient Program  Topics Covered: Dual Diagnosis  Participation: Active verbal participation  Affect/Mood Range: Normal range, Flexible  Affect/Mood Display: Congruent w/content  Cognition: Oriented, Alert  Evidence of Imminent Suicide Risk: No  Evidence of imminent homicide risk: No  Therapeutic Interventions: Emotion clarification, Cognitive clarification  Progress Toward Treatment Goal: Moderate improvement

## 2018-06-29 ENCOUNTER — APPOINTMENT (OUTPATIENT)
Dept: BEHAVIORAL HEALTH | Facility: MEDICAL CENTER | Age: 23
End: 2018-06-29
Attending: PSYCHIATRY & NEUROLOGY
Payer: OTHER GOVERNMENT

## 2019-01-22 PROBLEM — F43.10 PTSD (POST-TRAUMATIC STRESS DISORDER): Status: ACTIVE | Noted: 2019-01-22

## 2019-06-03 PROBLEM — R10.9 FLANK PAIN: Status: ACTIVE | Noted: 2019-06-03

## 2019-06-03 PROBLEM — R35.0 FREQUENCY OF MICTURITION: Status: ACTIVE | Noted: 2019-06-03

## 2019-06-03 PROBLEM — N20.0 KIDNEY STONE: Status: ACTIVE | Noted: 2019-06-03

## 2019-06-03 PROBLEM — R31.9 HEMATURIA: Status: ACTIVE | Noted: 2019-06-03

## 2020-10-28 PROBLEM — Z20.822 SUSPECTED COVID-19 VIRUS INFECTION: Status: ACTIVE | Noted: 2020-10-28

## 2020-12-12 PROBLEM — M54.41 ACUTE MIDLINE LOW BACK PAIN WITH BILATERAL SCIATICA: Status: ACTIVE | Noted: 2020-12-12

## 2020-12-12 PROBLEM — M54.42 ACUTE MIDLINE LOW BACK PAIN WITH BILATERAL SCIATICA: Status: ACTIVE | Noted: 2020-12-12

## 2021-08-24 NOTE — CARE PLAN
"Problem: Ineffective Coping  Goal: Achieve stable functioning  Apoorva will attend [facility] 9am-3pm M-F and process recent suicide attempt, develop coping skills and increase her understanding of her mental health.  She will verbalize strategies to enhance coping skills during process groups and in individual counseling sessions.      Intervention: [facility]  Therapy Progress Note    Patient Name: Apoorva Parry  Patient MRN: [id number]  Today's Date: 6/4/2018     Type of session:Individual psychotherapy  Length of session: 30 minutes  Persons in attendance:Patient    Subjective/New Info: reviewed weekly goals. Apoorva identified \"be able to balance life with my mental illness\". She is feeling more connected to her emotions and denies any self harm since program started. She is firm in her boundaries with others, she is self aware.     Objective/Observations:   Participation: Active verbal participation, Attentive, Engaged and Open to feedback   Grooming: Casual and Neat   Cognition: Alert and Fully Oriented   Eye contact: Good   Mood: Euthymic   Affect: Flexible, Full range and Congruent with content   Thought process: Logical and Goal-directed   Speech: Rate within normal limits and Volume within normal limits   Other:     Diagnoses: No diagnosis found. F33.2    Current risk:   SUICIDE: Not applicable   Homicide: Not applicable   Self-harm: Low   Relapse: Not applicable   Other:    Safety Plan reviewed? Not Indicated   If evidence of imminent risk is present, intervention/plan:     Therapeutic Intervention(s): Clarify:  Clarify feelings and Clarify thoughts, Interpersonal effectiveness skills, Review treatment plan, Self-care skills, Stressors assessed and Supportive psychotherapy    Treatment Goal(s)/Objective(s) addressed: boundaries, anxiety management, self care     Progress toward Treatment Goals: Moderate improvement    Plan:  - Continue Partial Hospital Program  - Next appointment " History and physical documented and up to date, allergies reviewed, lab results reviewed, pre-procedure education provided, patient verbalized understanding of procedure, procedural consent signed and patient is NPO. scheduled:  6/5/2018  - Patient is in agreement with the above plan:  YES    Hamida Parker R.N.  6/4/2018

## 2022-06-27 PROBLEM — T78.40XA ALLERGIES: Status: ACTIVE | Noted: 2022-06-27

## 2022-06-27 PROBLEM — F41.9 ANXIETY: Status: ACTIVE | Noted: 2022-06-27
